# Patient Record
(demographics unavailable — no encounter records)

---

## 2024-10-07 NOTE — DISCUSSION/SUMMARY
[FreeTextEntry1] : 38 year old woman  who is ~28 weeks pregnant~TREVIN 24 who comes in for evaluation She had episode of weakness and ended up in ED Was observed and told all normal Not happened since that time Encouraged frequent meals and hydration FU as needed [EKG obtained to assist in diagnosis and management of assessed problem(s)] : EKG obtained to assist in diagnosis and management of assessed problem(s)

## 2024-10-07 NOTE — HISTORY OF PRESENT ILLNESS
[FreeTextEntry1] : 38 year old woman  who is ~28 weeks pregnant~TREVIN 24 who comes in for evaluation She had episode of weakness and ended up in ED EKG normal No history.

## 2024-12-23 NOTE — HISTORY OF PRESENT ILLNESS
[Postpartum Follow Up] : postpartum follow up [Last Pap Date: ___] : Last Pap Date: [unfilled] [Delivery Date: ___] : on [unfilled] [Repeat C/S] : delivered by  section (repeat) [Male] : Delivery History: baby boy [Wt. ___] : weighing [unfilled] [Resumed Menses] : has not resumed her menses [Resumed [place]] : has not resumed intercourse [Intended Contraception] : Intended Contraception: [S/Sx PP Depression] : no signs/symptoms of postpartum depression [None] : No associated symptoms are reported [Clean/Dry/Intact] : clean, dry and intact [Healed] : healed [Doing Well] : is doing well [FreeTextEntry8] : Here for wound check. Repeat  25 [de-identified] : Considering options- counseled [de-identified] : Keep incision clean and dry. No heavy lifting. Breastfeeding/happy with baby. Contraceptive counseling- considering options. Hx postpartum depression last pregnancy- happy with baby. Discussed calling if any complaints depression- verbalized understanding .  # [id number]. RTC 4 weeks. MHegarty NP

## 2025-01-23 NOTE — HISTORY OF PRESENT ILLNESS
[Postpartum Follow Up] : postpartum follow up [Last Pap Date: ___] : Last Pap Date: [unfilled] [Delivery Date: ___] : on [unfilled] [Repeat C/S] : delivered by  section (repeat) [Male] : Delivery History: baby boy [Wt. ___] : weighing [unfilled] [Rhogam] : Rhogam was not administered [BTL] : no tubal ligation [Breastfeeding] : currently nursing [Discharge HCT: ___] : hematocrit level was [unfilled] [Discharge HGB: ___] : hemoglobin level was [unfilled] [Resumed Menses] : has not resumed her menses [Resumed [place]] : has not resumed intercourse [Intended Contraception] : Intended Contraception: [Condoms] : condoms [S/Sx PP Depression] : no signs/symptoms of postpartum depression [Clean/Dry/Intact] : clean, dry and intact [Back to Normal] : is back to normal in size [None] : no vaginal bleeding [Normal] : the vagina was normal [Examination Of The Breasts] : breasts are normal [Doing Well] : is doing well [FreeTextEntry8] : Here for postpartum exam.  25 39 weeks 3 days [de-identified] : Breastfeeding/happy with baby. Taking Prenatal vit 1 tab po OD. Rx breastpump as requested. Contraceptive counseling- will use condoms. RTC 1 year or prn. MHegarty NP

## 2025-05-02 NOTE — PHYSICAL EXAM
[MA] : MA [Appropriately responsive] : appropriately responsive [Alert] : alert [No Acute Distress] : no acute distress [Soft] : soft [Non-distended] : non-distended [Labia Majora] : normal [Labia Minora] : normal [Normal] : normal [Anteversion] : anteverted [Uterine Adnexae] : non-palpable [FreeTextEntry2] : [place]aley [FreeTextEntry4] : well-perfused [FreeTextEntry7] : Mild tenderness on left aspect of Pfannenstiel incision. Incision appears healed, c/d/i, no signs of infection or open areas. [FreeTextEntry5] : no cervical motion tenderness [FreeTextEntry8] : small anteverted mobile uterus

## 2025-05-02 NOTE — HISTORY OF PRESENT ILLNESS
[FreeTextEntry1] : Patient is [place]-speaking, but preferred her partner to interpret for this encounter.  Patient is 39yo  LMP 2024 (patient is breastfeeding) s/p rLTCS in 2024 who presents to GYN clinic for incisional pain. Patient states she has had intermittent pain in incision site since her , along with associated dyspareunia. Patient states her pain is currently 5/10 when pressing on incision. Patient states she takes Tylenol and Motrin when she has the pain and it helps temporarily. Patient denies fevers/chills, lightheadedness/dizziness, chest pain, SOB, dyspnea, abdominal pain, nausea, vomiting, dysuria, hematuria, pelvic pain, vaginal bleeding, discharge.  OB: : pLTCS in [place] per pt d/t fluid levels, 8#8 G2: SAB s/p D&C G3: rLTCS, 7#12 GYN: denies hx fibroids, ovarian cysts, abnormal paps -Pap (2024): NILM, neg HPV -Hx Trich in pregnancy  - both her and partner treated, CLARA neg -Contraception: condoms PMH: alpha thalassemia carrier PSH: C/S x2, D&C x1 All: NKDA Meds: Vit D3, PNV Psych: denies Soc: denies T/A/D

## 2025-05-02 NOTE — REVIEW OF SYSTEMS
[Negative] : Heme/Lymph [Urgency] : no urgency [Frequency] : no frequency [Incontinence] : no incontinence [Dysuria] : no dysuria [Urethral Discharge] : no urethral discharge [Abn Vaginal bleeding] : no abnormal vaginal bleeding [Pelvic pain] : no pelvic pain [CVA Pain] : no CVA pain [Genital Rash/Irritation] : no genital rash/irritation [FreeTextEntry8] :  incisional pain, dyspareunia

## 2025-05-02 NOTE — PLAN
[FreeTextEntry1] : 37yo  LMP 2024 who presents to GYN clinic for  incisional pain s/p rLTCS in 2024, with associated dyspareunia. Patient is currently breastfeeding. Pain is intermittent and improves with OTC pain medications. On exam, mild incisional tenderness noted at left aspect of Pfannenstiel incision. Incision appears healed, c/d/i. No signs of infection or opened areas on incision. Patient is vitally stable.  # incisional pain - Reassured patient that incision is well-healed and that pain at incision site can be expected - Explained that it will take time for nerve regeneration in that area. - Recommend patient to try massaging incision site to improve comfort - Patient can continue PO pain medication prn  #Dyspareunia - Explained to patient that this is likely 2/2 hypoestrogenic state while breastfeeding - Patient can use vaginal lubricant for improved comfort  #HCM - Last pap: 2024 NILM, neg HPV - Patient using condoms, declines other forms of contraception at this time - RTC in 6-7 months for next annual GYN visit  D/w Dr. Farfan, service attending Aydee Enriquez, PGY-1